# Patient Record
Sex: FEMALE | Race: WHITE | NOT HISPANIC OR LATINO
[De-identification: names, ages, dates, MRNs, and addresses within clinical notes are randomized per-mention and may not be internally consistent; named-entity substitution may affect disease eponyms.]

---

## 2023-08-21 PROBLEM — Z00.00 ENCOUNTER FOR PREVENTIVE HEALTH EXAMINATION: Status: ACTIVE | Noted: 2023-08-21

## 2023-08-30 ENCOUNTER — APPOINTMENT (OUTPATIENT)
Dept: ORTHOPEDIC SURGERY | Facility: CLINIC | Age: 73
End: 2023-08-30
Payer: MEDICARE

## 2023-08-30 VITALS — HEIGHT: 63 IN | RESPIRATION RATE: 16 BRPM | WEIGHT: 170 LBS | BODY MASS INDEX: 30.12 KG/M2

## 2023-08-30 DIAGNOSIS — Z86.39 PERSONAL HISTORY OF OTHER ENDOCRINE, NUTRITIONAL AND METABOLIC DISEASE: ICD-10-CM

## 2023-08-30 DIAGNOSIS — Z78.9 OTHER SPECIFIED HEALTH STATUS: ICD-10-CM

## 2023-08-30 DIAGNOSIS — I10 ESSENTIAL (PRIMARY) HYPERTENSION: ICD-10-CM

## 2023-08-30 PROCEDURE — 76882 US LMTD JT/FCL EVL NVASC XTR: CPT | Mod: LT

## 2023-08-30 PROCEDURE — 73140 X-RAY EXAM OF FINGER(S): CPT | Mod: FA

## 2023-08-30 PROCEDURE — 20612 ASPIRATE/INJ GANGLION CYST: CPT

## 2023-08-30 RX ORDER — LEVOTHYROXINE SODIUM 137 UG/1
TABLET ORAL
Refills: 0 | Status: ACTIVE | COMMUNITY

## 2023-08-30 NOTE — PHYSICAL EXAM
[de-identified] : On exam there is a cystic mass at the level of the ulnar aspect of the left thumb MP joint slightly tender.  She is also tender over the left basal joint.  Over the dorsal radial thumb metacarpal base there is a solid feeling soft tissue mass.  Overlying skin intact.  The mass appears more fixed to the metacarpal base [de-identified] : PA lateral oblique x-rays demonstrate left thumb basal joint osteoarthritis with joint space narrowing subchondral sclerosis and osteophyte formation.  Ultrasound of the left thumb demonstrates a hypoechoic mass at the level of the metacarpal head.  It is more of a solid mass over the thumb metacarpal base

## 2023-08-30 NOTE — HISTORY OF PRESENT ILLNESS
[Right] : right hand dominant [FreeTextEntry1] : Patient presents for evaluation of her left thumb base and left thumb ulnar MCP joint mass which she noticed 2 months ago without trauma. Patient notes that the masses do not fluctuate in size. Patient denies numbness and tingling. Patient notes having history of bilateral carpal tunnel release and left cubital decompression more than 20 years ago.

## 2023-08-30 NOTE — ASSESSMENT
[FreeTextEntry1] : Patient has left basal joint osteoarthritis as well as a likely ganglion at the level of the MP joint and a solid mass at the thumb metacarpal base.  Recommended an attempted aspiration of the distal soft tissue mass.  Under informed consent sterile conditions the mass was aspirated and appeared to have a small droplet of gelatinous fluid however the mass was still present post aspiration.  I will order an MRI with gadolinium to assess the solid component of the more proximal mass which also will likely look at the distal 1 as well.  We will call when I receive the results.  Discussed the likelihood of a complex ganglion

## 2023-09-07 ENCOUNTER — NON-APPOINTMENT (OUTPATIENT)
Age: 73
End: 2023-09-07

## 2023-09-08 ENCOUNTER — NON-APPOINTMENT (OUTPATIENT)
Age: 73
End: 2023-09-08

## 2023-09-08 ENCOUNTER — APPOINTMENT (OUTPATIENT)
Dept: ORTHOPEDIC SURGERY | Facility: CLINIC | Age: 73
End: 2023-09-08
Payer: MEDICARE

## 2023-09-08 PROCEDURE — 99443: CPT | Mod: 95

## 2023-10-11 ENCOUNTER — TRANSCRIPTION ENCOUNTER (OUTPATIENT)
Age: 73
End: 2023-10-11

## 2023-10-11 RX ORDER — CHLORHEXIDINE GLUCONATE 213 G/1000ML
1 SOLUTION TOPICAL DAILY
Refills: 0 | Status: DISCONTINUED | OUTPATIENT
Start: 2023-10-12 | End: 2023-10-12

## 2023-10-11 RX ORDER — ACETAMINOPHEN AND CODEINE PHOSPHATE 300; 30 MG/1; MG/1
300-30 TABLET ORAL
Qty: 10 | Refills: 0 | Status: ACTIVE | COMMUNITY
Start: 2023-10-11 | End: 1900-01-01

## 2023-10-11 NOTE — ASU PATIENT PROFILE, ADULT - NSICDXPASTSURGICALHX_GEN_ALL_CORE_FT
PAST SURGICAL HISTORY:  H/O carpal tunnel repair bilateral    History of tonsillectomy     Pacemaker     S/P cataract extraction bilateral    S/P cubital tunnel release

## 2023-10-11 NOTE — ASU PATIENT PROFILE, ADULT - SITE
left thumb Doxycycline Counseling:  Patient counseled regarding possible photosensitivity and increased risk for sunburn.  Patient instructed to avoid sunlight, if possible.  When exposed to sunlight, patients should wear protective clothing, sunglasses, and sunscreen.  The patient was instructed to call the office immediately if the following severe adverse effects occur:  hearing changes, easy bruising/bleeding, severe headache, or vision changes.  The patient verbalized understanding of the proper use and possible adverse effects of doxycycline.  All of the patient's questions and concerns were addressed.

## 2023-10-12 ENCOUNTER — RESULT REVIEW (OUTPATIENT)
Age: 73
End: 2023-10-12

## 2023-10-12 ENCOUNTER — EMERGENCY (EMERGENCY)
Facility: HOSPITAL | Age: 73
LOS: 1 days | Discharge: HOME | End: 2023-10-12
Attending: EMERGENCY MEDICINE | Admitting: EMERGENCY MEDICINE
Payer: MEDICARE

## 2023-10-12 ENCOUNTER — OUTPATIENT (OUTPATIENT)
Dept: OUTPATIENT SERVICES | Facility: HOSPITAL | Age: 73
LOS: 1 days | Discharge: HOME | End: 2023-10-12
Payer: MEDICARE

## 2023-10-12 ENCOUNTER — APPOINTMENT (OUTPATIENT)
Dept: ORTHOPEDIC SURGERY | Facility: AMBULATORY SURGERY CENTER | Age: 73
End: 2023-10-12
Payer: MEDICARE

## 2023-10-12 ENCOUNTER — TRANSCRIPTION ENCOUNTER (OUTPATIENT)
Age: 73
End: 2023-10-12

## 2023-10-12 VITALS
DIASTOLIC BLOOD PRESSURE: 69 MMHG | TEMPERATURE: 98 F | RESPIRATION RATE: 19 BRPM | SYSTOLIC BLOOD PRESSURE: 159 MMHG | HEART RATE: 71 BPM | OXYGEN SATURATION: 94 %

## 2023-10-12 VITALS
HEART RATE: 63 BPM | SYSTOLIC BLOOD PRESSURE: 150 MMHG | DIASTOLIC BLOOD PRESSURE: 66 MMHG | OXYGEN SATURATION: 93 % | RESPIRATION RATE: 16 BRPM | HEIGHT: 63.5 IN | TEMPERATURE: 98 F

## 2023-10-12 VITALS
OXYGEN SATURATION: 97 % | HEART RATE: 74 BPM | SYSTOLIC BLOOD PRESSURE: 113 MMHG | WEIGHT: 173.06 LBS | RESPIRATION RATE: 16 BRPM | HEIGHT: 63.5 IN | TEMPERATURE: 99 F | DIASTOLIC BLOOD PRESSURE: 54 MMHG

## 2023-10-12 VITALS
TEMPERATURE: 98 F | OXYGEN SATURATION: 98 % | RESPIRATION RATE: 15 BRPM | DIASTOLIC BLOOD PRESSURE: 67 MMHG | SYSTOLIC BLOOD PRESSURE: 157 MMHG | HEART RATE: 68 BPM

## 2023-10-12 DIAGNOSIS — Z98.890 OTHER SPECIFIED POSTPROCEDURAL STATES: Chronic | ICD-10-CM

## 2023-10-12 DIAGNOSIS — R00.1 BRADYCARDIA, UNSPECIFIED: ICD-10-CM

## 2023-10-12 DIAGNOSIS — Z95.0 PRESENCE OF CARDIAC PACEMAKER: Chronic | ICD-10-CM

## 2023-10-12 DIAGNOSIS — R55 SYNCOPE AND COLLAPSE: ICD-10-CM

## 2023-10-12 DIAGNOSIS — E78.00 PURE HYPERCHOLESTEROLEMIA, UNSPECIFIED: ICD-10-CM

## 2023-10-12 DIAGNOSIS — Z98.49 CATARACT EXTRACTION STATUS, UNSPECIFIED EYE: Chronic | ICD-10-CM

## 2023-10-12 DIAGNOSIS — Z86.79 PERSONAL HISTORY OF OTHER DISEASES OF THE CIRCULATORY SYSTEM: Chronic | ICD-10-CM

## 2023-10-12 DIAGNOSIS — Z95.0 PRESENCE OF CARDIAC PACEMAKER: ICD-10-CM

## 2023-10-12 DIAGNOSIS — Z86.39 PERSONAL HISTORY OF OTHER ENDOCRINE, NUTRITIONAL AND METABOLIC DISEASE: ICD-10-CM

## 2023-10-12 DIAGNOSIS — Z90.89 ACQUIRED ABSENCE OF OTHER ORGANS: Chronic | ICD-10-CM

## 2023-10-12 DIAGNOSIS — I10 ESSENTIAL (PRIMARY) HYPERTENSION: ICD-10-CM

## 2023-10-12 DIAGNOSIS — E03.9 HYPOTHYROIDISM, UNSPECIFIED: ICD-10-CM

## 2023-10-12 DIAGNOSIS — Z87.39 PERSONAL HISTORY OF OTHER DISEASES OF THE MUSCULOSKELETAL SYSTEM AND CONNECTIVE TISSUE: ICD-10-CM

## 2023-10-12 LAB
HCT VFR BLD CALC: 32.4 % — LOW (ref 34.5–45)
HCT VFR BLD CALC: 32.4 % — LOW (ref 34.5–45)
HGB BLD-MCNC: 11.1 G/DL — LOW (ref 11.5–15.5)
HGB BLD-MCNC: 11.1 G/DL — LOW (ref 11.5–15.5)
MCHC RBC-ENTMCNC: 33.6 PG — SIGNIFICANT CHANGE UP (ref 27–34)
MCHC RBC-ENTMCNC: 33.6 PG — SIGNIFICANT CHANGE UP (ref 27–34)
MCHC RBC-ENTMCNC: 34.3 GM/DL — SIGNIFICANT CHANGE UP (ref 32–36)
MCHC RBC-ENTMCNC: 34.3 GM/DL — SIGNIFICANT CHANGE UP (ref 32–36)
MCV RBC AUTO: 98.2 FL — SIGNIFICANT CHANGE UP (ref 80–100)
MCV RBC AUTO: 98.2 FL — SIGNIFICANT CHANGE UP (ref 80–100)
NRBC # BLD: 0 /100 WBCS — SIGNIFICANT CHANGE UP (ref 0–0)
NRBC # BLD: 0 /100 WBCS — SIGNIFICANT CHANGE UP (ref 0–0)
NT-PROBNP SERPL-SCNC: 422 PG/ML — HIGH (ref 0–300)
NT-PROBNP SERPL-SCNC: 422 PG/ML — HIGH (ref 0–300)
PLATELET # BLD AUTO: 205 K/UL — SIGNIFICANT CHANGE UP (ref 150–400)
PLATELET # BLD AUTO: 205 K/UL — SIGNIFICANT CHANGE UP (ref 150–400)
RBC # BLD: 3.3 M/UL — LOW (ref 3.8–5.2)
RBC # BLD: 3.3 M/UL — LOW (ref 3.8–5.2)
RBC # FLD: 12.5 % — SIGNIFICANT CHANGE UP (ref 10.3–14.5)
RBC # FLD: 12.5 % — SIGNIFICANT CHANGE UP (ref 10.3–14.5)
TROPONIN T, HIGH SENSITIVITY RESULT: 28 NG/L — SIGNIFICANT CHANGE UP (ref 0–51)
TROPONIN T, HIGH SENSITIVITY RESULT: 28 NG/L — SIGNIFICANT CHANGE UP (ref 0–51)
WBC # BLD: 9.02 K/UL — SIGNIFICANT CHANGE UP (ref 3.8–10.5)
WBC # BLD: 9.02 K/UL — SIGNIFICANT CHANGE UP (ref 3.8–10.5)
WBC # FLD AUTO: 9.02 K/UL — SIGNIFICANT CHANGE UP (ref 3.8–10.5)
WBC # FLD AUTO: 9.02 K/UL — SIGNIFICANT CHANGE UP (ref 3.8–10.5)

## 2023-10-12 PROCEDURE — 93280 PM DEVICE PROGR EVAL DUAL: CPT | Mod: 26

## 2023-10-12 PROCEDURE — 99285 EMERGENCY DEPT VISIT HI MDM: CPT

## 2023-10-12 PROCEDURE — 93288 INTERROG EVL PM/LDLS PM IP: CPT

## 2023-10-12 PROCEDURE — 99284 EMERGENCY DEPT VISIT MOD MDM: CPT | Mod: 25

## 2023-10-12 PROCEDURE — 88304 TISSUE EXAM BY PATHOLOGIST: CPT | Mod: 26

## 2023-10-12 PROCEDURE — 93005 ELECTROCARDIOGRAM TRACING: CPT | Mod: XU

## 2023-10-12 PROCEDURE — 36415 COLL VENOUS BLD VENIPUNCTURE: CPT

## 2023-10-12 PROCEDURE — 83880 ASSAY OF NATRIURETIC PEPTIDE: CPT

## 2023-10-12 PROCEDURE — 93010 ELECTROCARDIOGRAM REPORT: CPT

## 2023-10-12 PROCEDURE — 85027 COMPLETE CBC AUTOMATED: CPT

## 2023-10-12 PROCEDURE — 26160 REMOVE TENDON SHEATH LESION: CPT | Mod: FA

## 2023-10-12 PROCEDURE — 84484 ASSAY OF TROPONIN QUANT: CPT

## 2023-10-12 RX ORDER — SODIUM CHLORIDE 9 MG/ML
500 INJECTION, SOLUTION INTRAVENOUS
Refills: 0 | Status: DISCONTINUED | OUTPATIENT
Start: 2023-10-12 | End: 2023-10-12

## 2023-10-12 RX ORDER — LEVOTHYROXINE SODIUM 125 MCG
1 TABLET ORAL
Refills: 0 | DISCHARGE

## 2023-10-12 RX ORDER — AMLODIPINE BESYLATE 2.5 MG/1
1 TABLET ORAL
Refills: 0 | DISCHARGE

## 2023-10-12 RX ORDER — OXYCODONE AND ACETAMINOPHEN 5; 325 MG/1; MG/1
1 TABLET ORAL EVERY 4 HOURS
Refills: 0 | Status: DISCONTINUED | OUTPATIENT
Start: 2023-10-12 | End: 2023-10-12

## 2023-10-12 RX ORDER — ROSUVASTATIN CALCIUM 5 MG/1
1 TABLET ORAL
Refills: 0 | DISCHARGE

## 2023-10-12 RX ORDER — ACETAMINOPHEN 500 MG
650 TABLET ORAL ONCE
Refills: 0 | Status: DISCONTINUED | OUTPATIENT
Start: 2023-10-12 | End: 2023-10-12

## 2023-10-12 RX ORDER — TELMISARTAN 20 MG/1
1 TABLET ORAL
Refills: 0 | DISCHARGE

## 2023-10-12 RX ORDER — ONDANSETRON 8 MG/1
4 TABLET, FILM COATED ORAL ONCE
Refills: 0 | Status: DISCONTINUED | OUTPATIENT
Start: 2023-10-12 | End: 2023-10-12

## 2023-10-12 NOTE — PROGRESS NOTE ADULT - SUBJECTIVE AND OBJECTIVE BOX
Electrophysiology Device Interrogation       Indication: HR 30 on the monitor    Device model: 	[employer] Edora dual chamber pacemaker		                            Functioning Mode: DDD-CLS     Underlying Rhythm:  sinus bradycardia 30s with PVC bigeminy. AP 78%    Pacemaker dependency: not dependent    Battery status: 7 years    Interrogating parameters:   				RA			RV	  Sense:                                   3.6mV                        12.8mV  Threshold:                         0.7V@0.4ms               0.7V@0.4ms                                                                             Pacing Impedance:              487 ohms                       565                                                                                      Events/Alert:  none    Parameter change: 	 none    Assessment:    normal functioning dual chamber pacemaker. Per report, pt noted to have HR of 30 on the telemetry after surgery, could be 2/2 telemetry monitor inappropriately sensing high burden PVCs (pt HR is 70, so if counting every other beat would report a HR of 35).

## 2023-10-12 NOTE — ED ADULT NURSE NOTE - NSFALLDEVICES_ED_ALL_ED
History  Chief Complaint   Patient presents with    Leg Pain    Earache    Sore Throat     58-year-old female with multiple complaints  She states on her right thigh she has some redness and tenderness for 2-3 weeks  There is more pain when she bears weight  She denies any known insect bite or removing any type of a tick  She noted at work when evening and when she came home it was red and she works in a kitchen  She did not seek medical attention for this  She was concerned because over the last 2 days she has had increasingly painful sore throat and bilateral ear pain with swallowing worse on the right than the left  She denies any fever chills or rigors          Prior to Admission Medications   Prescriptions Last Dose Informant Patient Reported? Taking?   etonogestrel (NEXPLANON) subdermal implant   Yes Yes   Si mg by Subdermal route once   fluticasone (FLONASE) 50 mcg/act nasal spray   No No   Si sprays into each nostril daily   naproxen (NAPROSYN) 500 mg tablet   No No   Sig: Take 1 tablet (500 mg total) by mouth 2 (two) times a day as needed for mild pain (Fever)   Patient not taking: Reported on 2019   ondansetron (ZOFRAN) 4 mg tablet   No No   Sig: Take 1 tablet (4 mg total) by mouth every 8 (eight) hours as needed for nausea or vomiting   Patient not taking: Reported on 2019   oxybutynin (DITROPAN-XL) 5 mg 24 hr tablet   Yes Yes   Sig: Take 5 mg by mouth daily   silver sulfadiazine (SILVADENE,SSD) 1 % cream   No No   Sig: Apply topically daily   Patient not taking: Reported on 2019      Facility-Administered Medications: None       Past Medical History:   Diagnosis Date    Contraceptive use     Overactive bladder        History reviewed  No pertinent surgical history  History reviewed  No pertinent family history  I have reviewed and agree with the history as documented      Social History     Tobacco Use    Smoking status: Former Smoker     Types: Cigarettes    Smokeless tobacco: Never Used   Substance Use Topics    Alcohol use: No    Drug use: Never        Review of Systems   Constitutional: Negative  HENT: Positive for ear pain and sore throat  Eyes: Negative  Respiratory: Negative  Cardiovascular: Negative  Gastrointestinal: Negative  Skin: Positive for color change and wound  Psychiatric/Behavioral: Negative  Physical Exam  Physical Exam   Constitutional: She appears well-developed and well-nourished  HENT:   Head: Normocephalic and atraumatic  Right Ear: There is tenderness  Left Ear: Tympanic membrane normal    Mouth/Throat: Mucous membranes are normal  Oropharyngeal exudate present  Neck: Normal range of motion  Neck supple  Cardiovascular: Normal rate, regular rhythm, normal heart sounds and intact distal pulses  No murmur heard  Pulmonary/Chest: Effort normal and breath sounds normal    Abdominal: Soft  Bowel sounds are normal    Nursing note and vitals reviewed        Vital Signs  ED Triage Vitals [10/21/19 2344]   Temperature Pulse Respirations Blood Pressure SpO2   97 8 °F (36 6 °C) 79 16 133/86 100 %      Temp src Heart Rate Source Patient Position - Orthostatic VS BP Location FiO2 (%)   -- -- -- -- --      Pain Score       9           Vitals:    10/21/19 2344   BP: 133/86   Pulse: 79         Visual Acuity      ED Medications  Medications   doxycycline hyclate (VIBRAMYCIN) capsule 100 mg (has no administration in time range)       Diagnostic Studies  Results Reviewed     Procedure Component Value Units Date/Time    Rapid Strep A Screen Only, Adults [[id number]]  (Normal) Collected:  10/22/19 0015    Lab Status:  Final result Specimen:  Throat Updated:  10/22/19 0028     Rapid Strep A Screen Negative                 No orders to display              Procedures  Procedures       ED Course                               MDM    Disposition  Final diagnoses:   Pharyngitis, unspecified etiology   Cellulitis and abscess of left leg     Time reflects when diagnosis was documented in both MDM as applicable and the Disposition within this note     Time User Action Codes Description Comment    10/22/2019 12:38 AM Anali Krishna [J02 9] Pharyngitis, unspecified etiology     10/22/2019 12:39 AM Anali Bo Krishna [M62 [id number],  L02 416] Cellulitis and abscess of left leg       ED Disposition     ED Disposition Condition Date/Time Comment    Discharge Stable Tue Oct 22, 2019 12:38 AM Amita Rosenberg discharge to home/self care  Follow-up Information     Follow up With Specialties Details Why [address], DO Internal Medicine, Emergency Medicine In 2 days  Anoop Ohavinash [address] [place] [address]  [phone]            Patient's Medications   Discharge Prescriptions    DOXYCYCLINE HYCLATE (VIBRAMYCIN) 100 MG CAPSULE    Take 1 capsule (100 mg total) by mouth 2 (two) times a day for 10 days       Start Date: 10/22/2019End Date: 11/1/2019       Order Dose: 100 mg       Quantity: 20 capsule    Refills: 0     No discharge procedures on file      ED Provider  Electronically Signed by           Ursula Hartley MD  10/22/19 0040       Ursula Hartley MD  10/22/19 1016 None

## 2023-10-12 NOTE — ASU DISCHARGE PLAN (ADULT/PEDIATRIC) - NS MD DC FALL RISK RISK
For information on Fall & Injury Prevention, visit: [email or web address] OR  [email or web address] OR  https://www.cdc.gov/steadi/patient.html

## 2023-10-12 NOTE — ED ADULT NURSE NOTE - CHIEF COMPLAINT QUOTE
pt biems as a transfer from [facility] s/p near syncopal episode s/p procedure for removal of soft tissue mass on the left hand. Pt had an episode of bradycardia (HR 30's). Pt HR 60's at this time. Pt denies any dizziness, SOB, CP, HA, N/V/D. Pt has pmhx of pacemaker.

## 2023-10-12 NOTE — ED PROVIDER NOTE - PATIENT PORTAL LINK FT
You can access the FollowMyHealth Patient Portal offered by [facility] by registering at the following website: [email or web address]. By joining [employer]’s FollowMyHealth portal, you will also be able to view your health information using other applications (apps) compatible with our system.

## 2023-10-12 NOTE — ED ADULT NURSE REASSESSMENT NOTE - NS ED NURSE REASSESS COMMENT FT1
Electrophysiology at bedside interrogating pacemaker. Pt noted to have run of Vtach on monitor, electrophysiology states related to interrogation. NAD. Continuous cardiac/pulse oximetry monitoring remains in place.

## 2023-10-12 NOTE — ED ADULT NURSE NOTE - OBJECTIVE STATEMENT
73yoM BIBEMS s/p L arm surgery and epsiode of going bradycardic to the 30s after OR. pt states that she came out of the OR around 0930, and the incident happened around 1000am. Pt denies LOC but was symptomatic. Pt had pacemaker placed three years ago for "fluctuating HR". Pt states that the pacemaker was last checked in April of this year, with no problems identified. Pt AOX4 upon arrival HR in the 70s, Pt placed on CCM, EKG performed. L radial pulse palpable. Pt positive sensation, able to wiggle fingers, warm to touch. Pt denies chest pain, SOB, n/v/d

## 2023-10-12 NOTE — ED PROVIDER NOTE - CLINICAL SUMMARY MEDICAL DECISION MAKING FREE TEXT BOX
73-year-old female status post left hand soft tissue excision for a mass.  Postop patient was symptomatically bradycardic.  Upon arrival to the ER for evaluation patient is asymptomatic.  EKG shows heart rate in 70s with occasional PVCs.  EP was consulted, the patient's pacemaker was interrogated.  No arrhythmias were found no episodes of bradycardia were determined.  This was potentially attributable to patient's PVCs.  Currently patient is tolerating p.o. water and ambulatory. patient feels at baseline and would like to go home. The case was discussed with the anesthesiologist prior to patient's arrival.

## 2023-10-12 NOTE — ASU DISCHARGE PLAN (ADULT/PEDIATRIC) - CARE PROVIDER_API CALL
Tanner Robert  [facility]  [address], [address] [place] [address]  Phone: [phone]  Fax: [phone]  Follow Up Time:

## 2023-10-12 NOTE — ASU DISCHARGE PLAN (ADULT/PEDIATRIC) - ASU DC SPECIAL INSTRUCTIONSFT
Co-Directors: Leo Ku MD; MD Angel Bourgeois MD   [facility] [address] 	  [place] [address] 	 	  Phone [phone] (HAND), Fax [phone]    [email or web address]    Hand Surgery Post Operative Instructions      DRESSING CARE:  1.	Please keep bandage ON and DRY until you return to the office for your 1st postoperative visit.   2.	In the shower you must cover bandage with a plastic bag. You can use tape or a rubber band so no water leaks into the bag.   3.	Please do not exercise as that leads to excessive sweating as the bandage will therefore become moist/ wet.    4.	Do not remove or change your bandage. You may apply more tape if dressing starts to unravel.   5.	Please do not insert any objects, such as a pencil, down into the bandage.     ELEVATION:  1.	Keep hand/wrist above heart level at all times or until bandage feels loose. This will help with swelling of the fingers and can be accomplished by using the FOAM PILLOW.   2.	 A sling will not hold your hand/wrist above your heart and therefore its use should be limited (it may also cause shoulder stiffness).     ACTIVITY:  1.	Moving your fingers daily after surgery is very important to prevent stiffness. Please open your fingers completely and close your fingers completely to achieve full range of motion.  **UNLESS TENDON REPAIR OR NERVE REPAIR SURGERY PERFORMED    2.	Move all joints of the extremity that are not immobilized to prevent stiffness (i.e. shoulder, elbow, fingers, and thumb unless instructed otherwise).   3.	Avoid activities which may re-injure your hand or finger.     DIET:   Regular diet. Start light and progress as tolerated.     PAIN MEDICINE:   1.	Pain medicine was sent to your pharmacy.  2.	Take pain medicine on an “AS NEEDED” basis according to your doctor’s instructions.   3.	Your pain will decrease over the next few days allowing you to:   •	Decrease your pain medicine quantity until you stop.   •	Increase the time between doses until you stop.   4.	You should not drink alcoholic beverages while on pain medication.   5.	Take pain medicine with food to prevent nausea.   6.	Constipation can occur. If no bowel movement occurs within 48 hours take a laxative of your choice (over the counter).	 	      CONTACT PHYSICIAN FOR:   Slight pain, swelling and bluish discoloration are to be expected. If you have breathing difficulty or chest pain dial 911 immediately. However, if the following symptoms occur notify your physician:   •	Temperature above 101° F 	        • Inability to urinate in 8 hours   •	Uncontrolled nausea/vomiting   	• Progressively increasing pain   •	Signs of wound infection 	                • Excessive bleeding  (Redness, swelling, pus-like drainage)     • Increasing numbness   •	Excessive swelling and tightness 	• Splint or cast that is too tight      OFFICE APPOINTMENT:    A staff member from the office will call you in the next 1-2 days to schedule your 1st Post Operative appointment to see your physician back in the office. *IF someone does not reach out to you in the next 1-2 days please call the office.      Patient Name _________________________________ Signature ______________________________ Date__________    Witness _____________________________________________________ Date ______________

## 2023-10-12 NOTE — ED PROVIDER NOTE - MUSCULOSKELETAL, MLM
Spine appears normal, range of motion is not limited, no muscle or joint tenderness. Left hand in wrapped in dressing and immobilized.

## 2023-10-12 NOTE — ED ADULT NURSE REASSESSMENT NOTE - NS ED NURSE REASSESS COMMENT FT1
pt ambulated around the department with steady gait. HR remained 63-69, pt denies chest pain. PT o2 st remained in the low 90s, on room air. Pt denies SOB or trouble breathing. MD Webster aware. PreOP testing paperwork from [facility] was checked, Pt O2 sat at preop check-up was 93% on room air. Pt discharged home. Md Webster aware.

## 2023-10-12 NOTE — ED PROVIDER NOTE - OBJECTIVE STATEMENT
73-year-old female with a history of hypothyroidism, hypertension, high cholesterol was at [facility] today  for a left hand mass excision.  While in recovery patient felt lightheaded was noted to possibly have near syncope her pulse was found to be 34.  Anesthesiologist intervene she was given a medication which improved her heart rate.  Patient has a history of pacemaker implantation.  There was concern that the pacemaker was not functioning.  Upon arrival to the ER the patient is asymptomatic.

## 2023-10-12 NOTE — PRE-ANESTHESIA EVALUATION ADULT - NSANTHOSAYNRD_GEN_A_CORE
No. LENO screening performed.  STOP BANG Legend: 0-2 = LOW Risk; 3-4 = INTERMEDIATE Risk; 5-8 = HIGH Risk

## 2023-10-12 NOTE — ED ADULT NURSE NOTE - NSFALLHARMRISKINTERV_ED_ALL_ED
Assistance OOB with selected safe patient handling equipment if applicable/Communicate risk of Fall with Harm to all staff, patient, and family/Encourage patient to sit up slowly, dangle for a short time, stand at bedside before walking/Provide visual cue: red socks, yellow wristband, yellow gown, etc/Reinforce activity limits and safety measures with patient and family/Review medications for side effects contributing to fall risk/Toileting schedule using arm’s reach rule for commode and bathroom/Bed in lowest position, wheels locked, appropriate side rails in place/Call bell, personal items and telephone in reach/Instruct patient to call for assistance before getting out of bed/chair/stretcher/Non-slip footwear applied when patient is off stretcher/[place] to call system/Physically safe environment - no spills, clutter or unnecessary equipment/Purposeful Proactive Rounding/Room/bathroom lighting operational, light cord in reach

## 2023-10-12 NOTE — CONSULT NOTE ADULT - SUBJECTIVE AND OBJECTIVE BOX
Orthopaedic Surgery Consult Note    For Surgeon: Dr. Robert    HPI:  73yFemale Hx PPM s/p Left thumb cyst excision by Dr. Robert at [facility] today transferred to [facility] ED. Patient and  at bedside, states in the recovery room at [facility] patient heart rate went very low "into the 30s". Transferred for further workup. Currently denies any chest pain, SOB, dizziness, numbness, tingling.      Allergies    No Known Allergies      PAST MEDICAL & SURGICAL HISTORY:  Thyroid disease      H/O right bundle branch block      Pacemaker      H/O carpal tunnel repair  bilateral      S/P cubital tunnel release      S/P cataract extraction  bilateral      History of tonsillectomy            Vital Signs Last 24 Hrs  T(C): 36.7 (12 Oct 2023 12:52), Max: 37.1 (12 Oct 2023 06:49)  T(F): 98.1 (12 Oct 2023 12:52), Max: 98.8 (12 Oct 2023 06:49)  HR: 63 (12 Oct 2023 12:52) (46 - 74)  BP: 150/66 (12 Oct 2023 12:52) (113/54 - 150/66)  BP(mean): --  RR: 16 (12 Oct 2023 12:52) (16 - 16)  SpO2: 93% (12 Oct 2023 12:52) (93% - 98%)    Parameters below as of 12 Oct 2023 12:52  Patient On (Oxygen Delivery Method): nasal cannula  O2 Flow (L/min): 2      Physical Exam:  LUE: gallito block pillow in place  dsg c/d/i w/ splint/kerlix  brisk cap refill left digits  motor intact left digits  sensation intact left digits                            11.1   9.02  )-----------( 205      ( 12 Oct 2023 13:17 )             32.4       A/P: 73yFemale POD#0 s/p Left thumb cyst excision transferred for postop bradycadia  -NWB/elevation in gallito block pillow  -Keep dressing dry at all times  -Pain control  -Cardiac/EP workup per ED/primary team  -Discussed with Dr. Robert    Ortho Pager [phone]

## 2023-10-12 NOTE — ED ADULT TRIAGE NOTE - CHIEF COMPLAINT QUOTE
pt biems as a transfer from [facility] s/p near syncopal episode s/p procedure for removal of soft tissue mass on the left hand. Pt had an episode of bradycardia (HR 30's). Pt HR 60's at this time. Pt denies any dizziness, SOB, CP, HA, N/V/D. Pt has pmhx of pacemaker.

## 2023-10-13 PROBLEM — Z86.79 PERSONAL HISTORY OF OTHER DISEASES OF THE CIRCULATORY SYSTEM: Chronic | Status: ACTIVE | Noted: 2023-10-12

## 2023-10-13 PROBLEM — E07.9 DISORDER OF THYROID, UNSPECIFIED: Chronic | Status: ACTIVE | Noted: 2023-10-12

## 2023-10-23 ENCOUNTER — NON-APPOINTMENT (OUTPATIENT)
Age: 73
End: 2023-10-23

## 2023-10-23 ENCOUNTER — APPOINTMENT (OUTPATIENT)
Dept: ORTHOPEDIC SURGERY | Facility: CLINIC | Age: 73
End: 2023-10-23
Payer: MEDICARE

## 2023-10-23 PROBLEM — Z95.0 PRESENCE OF CARDIAC PACEMAKER: Chronic | Status: ACTIVE | Noted: 2023-10-14

## 2023-10-23 PROBLEM — R00.1 BRADYCARDIA, UNSPECIFIED: Chronic | Status: ACTIVE | Noted: 2023-10-14

## 2023-10-23 PROCEDURE — 99024 POSTOP FOLLOW-UP VISIT: CPT

## 2023-10-24 LAB
SURGICAL PATHOLOGY STUDY: SIGNIFICANT CHANGE UP
SURGICAL PATHOLOGY STUDY: SIGNIFICANT CHANGE UP

## 2023-12-04 ENCOUNTER — APPOINTMENT (OUTPATIENT)
Dept: ORTHOPEDIC SURGERY | Facility: CLINIC | Age: 73
End: 2023-12-04
Payer: MEDICARE

## 2023-12-04 PROCEDURE — 99024 POSTOP FOLLOW-UP VISIT: CPT

## 2024-01-07 NOTE — PRE-ANESTHESIA EVALUATION ADULT - NSPREOPDXFT_GEN_ALL_CORE
Problem: Breathing Pattern Ineffective  Goal: Air exchange is effective, demonstrated by Sp02 sat of greater then or = 92% (or as ordered)  Outcome: Outcome Not Met, Continue to Monitor     Problem: Pneumonia  Goal: S/S of acute pneumonia are resolved  Description: If acute pneumonia is present, monitor for resolution of fever, cough, secretions and other test values based on presentation.  Outcome: Outcome Not Met, Continue to Monitor     Problem: Impaired Physical Mobility  Goal: # Bed mobility, ambulation, and ADLs are maintained or returned to baseline during hospitalization  Outcome: Outcome Not Met, Continue to Monitor     Problem: Activity Intolerance  Goal: # Functional status is maintained or returned to baseline  Outcome: Outcome Not Met, Continue to Monitor      Left thumb soft tissue masses

## 2024-01-17 ENCOUNTER — APPOINTMENT (OUTPATIENT)
Dept: ORTHOPEDIC SURGERY | Facility: CLINIC | Age: 74
End: 2024-01-17
Payer: MEDICARE

## 2024-01-17 VITALS — RESPIRATION RATE: 16 BRPM | BODY MASS INDEX: 30.12 KG/M2 | WEIGHT: 170 LBS | HEIGHT: 63 IN

## 2024-01-17 PROCEDURE — 76882 US LMTD JT/FCL EVL NVASC XTR: CPT | Mod: LT

## 2024-01-17 PROCEDURE — 99213 OFFICE O/P EST LOW 20 MIN: CPT

## 2024-01-17 PROCEDURE — 73140 X-RAY EXAM OF FINGER(S): CPT | Mod: F1,FA

## 2024-02-20 NOTE — ASSESSMENT
[FreeTextEntry1] : Status post left ganglion cyst excision CMC joint and MCP joint of left thumb.  Unclear if this is just scar tissue or could there be a small fluid collection.  Possible but unlikely at the MCP joint.  For now recommend observation and scar massage and I will see him back in 2 months and hopefully scar tissue will soften.

## 2024-02-20 NOTE — PHYSICAL EXAM
[de-identified] : Incision healing nicely but slight thickness particularly of left thumb ulnar collateral ligament area.  No palpable discrete mass.  Slight thickness of scar along trapeziometacarpal joint but no recurrence at the left thumb. [de-identified] : Ultrasound today of the left thumb demonstrates only scar tissue at trapeziometacarpal joint.  There is also scar tissue with a small amount of fluid superficial to the extensor pelletier possibly

## 2024-02-20 NOTE — HISTORY OF PRESENT ILLNESS
[Right] : right hand dominant [FreeTextEntry1] : DOS: 10/12/2023. 3 months s/p Left thumb ganglion cyst excision emanating from the metacarpophalangeal joint of the left thumb,Left thumb ganglion cyst excision emanating from the carpometacarpal joint of the left thumb through a separate incision. She states she has noticed swelling of left thumb since surgery.

## 2024-03-18 ENCOUNTER — APPOINTMENT (OUTPATIENT)
Dept: ORTHOPEDIC SURGERY | Facility: CLINIC | Age: 74
End: 2024-03-18
Payer: MEDICARE

## 2024-03-18 VITALS — HEIGHT: 63 IN | WEIGHT: 170 LBS | RESPIRATION RATE: 16 BRPM | BODY MASS INDEX: 30.12 KG/M2

## 2024-03-18 DIAGNOSIS — M79.89 OTHER SPECIFIED SOFT TISSUE DISORDERS: ICD-10-CM

## 2024-03-18 PROCEDURE — 99213 OFFICE O/P EST LOW 20 MIN: CPT

## 2024-03-18 NOTE — HISTORY OF PRESENT ILLNESS
[Left] : left hand dominant [FreeTextEntry1] : DOS: 10/12/2023 5 months s/p  Left thumb ganglion cyst excision emanating from the metacarpophalangeal joint of the left thumb, Left thumb ganglion cyst excision emanating from the carpometacarpal joint of the left thumb through a separate incision. Patient continues home exercises everyday and states that there is some residual left thumb swelling.

## 2024-03-18 NOTE — PHYSICAL EXAM
[de-identified] : On exam she has excellent range of motion and skin incision has healed beautifully.  There certainly is slight thickness of the ulnar aspect of the metacarpal head.  Cannot tell if it is a recurrent ganglion versus scar tissue.

## 2024-03-18 NOTE — ASSESSMENT
[FreeTextEntry1] : Patient with persistent swelling along the ulnar border of the thumb MCP joint which is stable.  Could be a recurrent ganglion versus scar tissue versus extensor pelletier repair.  Her symptoms are mild so she is going to observe it and if it continues to improve no further treatment necessary.  If it causes enough symptoms to warrant a possible aspiration and injection she will return

## 2024-10-09 NOTE — ASU PREOP CHECKLIST - HEIGHT IN FEET
Medication(s) requesting:   Requested Prescriptions     Pending Prescriptions Disp Refills    busPIRone (BUSPAR) 30 MG tablet 180 tablet 2     Sig: Take 30 mg by mouth in the morning and at bedtime       Last office visit:  09/05/2024  Next office visit DMA: 10/25/2024  
5

## (undated) DEVICE — GLV 8 PROTEXIS (WHITE)

## (undated) DEVICE — SUT SILK 4-0 18" PS-2

## (undated) DEVICE — SLV COMPRESSION KNEE MED

## (undated) DEVICE — SUT ETHILON 5-0 18" P-3

## (undated) DEVICE — PACK HAND

## (undated) DEVICE — GLV 8.5 PROTEXIS (WHITE)

## (undated) DEVICE — MARKING PEN W RULER

## (undated) DEVICE — WARMING BLANKET LOWER ADULT

## (undated) DEVICE — TOURNIQUET CUFF 18" DUAL PORT SINGLE BLADDER W PLC  (BLACK)